# Patient Record
Sex: FEMALE | Race: OTHER | Employment: UNEMPLOYED | URBAN - METROPOLITAN AREA
[De-identification: names, ages, dates, MRNs, and addresses within clinical notes are randomized per-mention and may not be internally consistent; named-entity substitution may affect disease eponyms.]

---

## 2020-06-03 ENCOUNTER — HOSPITAL ENCOUNTER (EMERGENCY)
Age: 45
Discharge: HOME | End: 2020-06-03
Attending: EMERGENCY MEDICINE
Payer: COMMERCIAL

## 2020-06-03 VITALS
WEIGHT: 142 LBS | OXYGEN SATURATION: 99 % | HEIGHT: 67 IN | DIASTOLIC BLOOD PRESSURE: 75 MMHG | BODY MASS INDEX: 22.29 KG/M2 | RESPIRATION RATE: 16 BRPM | HEART RATE: 93 BPM | TEMPERATURE: 98.6 F | SYSTOLIC BLOOD PRESSURE: 116 MMHG

## 2020-06-03 DIAGNOSIS — M75.90 BURSITIS AND TENDINITIS OF SHOULDER REGION: ICD-10-CM

## 2020-06-03 DIAGNOSIS — M75.50 BURSITIS AND TENDINITIS OF SHOULDER REGION: ICD-10-CM

## 2020-06-03 DIAGNOSIS — M79.602 ARM PAIN, ANTERIOR, LEFT: Primary | ICD-10-CM

## 2020-06-03 PROCEDURE — 74011636637 HC RX REV CODE- 636/637: Performed by: EMERGENCY MEDICINE

## 2020-06-03 PROCEDURE — 99283 EMERGENCY DEPT VISIT LOW MDM: CPT

## 2020-06-03 PROCEDURE — 74011250637 HC RX REV CODE- 250/637: Performed by: EMERGENCY MEDICINE

## 2020-06-03 RX ORDER — PREDNISONE 20 MG/1
60 TABLET ORAL DAILY
Qty: 15 TAB | Refills: 0 | Status: SHIPPED | OUTPATIENT
Start: 2020-06-03 | End: 2020-06-08

## 2020-06-03 RX ORDER — PREDNISONE 20 MG/1
60 TABLET ORAL
Status: COMPLETED | OUTPATIENT
Start: 2020-06-03 | End: 2020-06-03

## 2020-06-03 RX ORDER — TRAMADOL HYDROCHLORIDE 50 MG/1
50 TABLET ORAL
Qty: 10 TAB | Refills: 0 | Status: SHIPPED | OUTPATIENT
Start: 2020-06-03 | End: 2020-06-06

## 2020-06-03 RX ORDER — TRAMADOL HYDROCHLORIDE 50 MG/1
50 TABLET ORAL
Status: COMPLETED | OUTPATIENT
Start: 2020-06-03 | End: 2020-06-03

## 2020-06-03 RX ADMIN — PREDNISONE 60 MG: 20 TABLET ORAL at 09:21

## 2020-06-03 RX ADMIN — TRAMADOL HYDROCHLORIDE 50 MG: 50 TABLET, FILM COATED ORAL at 09:21

## 2020-06-03 NOTE — DISCHARGE INSTRUCTIONS
Patient Education        Bursitis: Care Instructions  Your Care Instructions     A bursa is a small sac of fluid that helps the tissues around a joint slide over one another easily. Injury or overuse of a joint can cause pain, redness, and inflammation in the bursa (bursitis). Bursitis usually gets better if you avoid the activity that caused it. You can help prevent bursitis from coming back by doing stretching and strengthening exercises. You may also need to change the way you do some activities. Follow-up care is a key part of your treatment and safety. Be sure to make and go to all appointments, and call your doctor if you are having problems. It's also a good idea to know your test results and keep a list of the medicines you take. How can you care for yourself at home? · Put ice or a cold pack on the area for 10 to 20 minutes at a time. Try to do this every 1 to 2 hours for the next 3 days (when you are awake) or until the swelling goes down. Put a thin cloth between the ice and your skin. · After the 3 days of using ice, you may use heat on the area. You can use a hot water bottle; a warm, moist towel; or a heating pad set on low. You can also try alternating heat and ice. · Rest the area where you have pain. Stop any activities that cause pain. Switch to activities that do not stress the area. · Take pain medicines exactly as directed. ? If the doctor gave you a prescription medicine for pain, take it as prescribed. ? If you are not taking a prescription pain medicine, ask your doctor if you can take an over-the-counter medicine. ? Do not take two or more pain medicines at the same time unless the doctor told you to. Many pain medicines have acetaminophen, which is Tylenol. Too much acetaminophen (Tylenol) can be harmful. · To prevent stiffness, gently move the joint as much as you can without pain every day. As the pain gets better, keep doing range-of-motion exercises.  Ask your doctor for exercises that will make the muscles around the joint stronger. Do these as directed. · You can slowly return to the activity that caused the pain, but do it with less effort until you can do it without pain or swelling. Be sure to warm up before and stretch after you do the activity. When should you call for help? Call your doctor now or seek immediate medical care if:  · You have new or worse symptoms of infection, such as:  ? Increased pain, swelling, warmth, or redness. ? Red streaks leading from the area. ? Pus draining from the area. ? A fever. Watch closely for changes in your health, and be sure to contact your doctor if:  · You do not get better as expected. Where can you learn more? Go to http://talya-swathi.info/  Enter Q970 in the search box to learn more about \"Bursitis: Care Instructions. \"  Current as of: March 2, 2020               Content Version: 12.5  © 7089-3824 [employer]. Care instructions adapted under license by [employer] (which disclaims liability or warranty for this information). If you have questions about a medical condition or this instruction, always ask your healthcare professional. [place] [address] any warranty or liability for your use of this information. Patient Education        Shoulder Bursitis: Care Instructions  Your Care Instructions     Bursitis is inflammation of the bursa. A bursa is a small sac of fluid that cushions a joint and helps it move easily. A bursa sits under the highest point of your shoulder. You can get bursitis by overusing your shoulder, which can happen with activities such as lifting, pitching a ball, or painting. Symptoms of bursitis include pain when you move your arm. Your arm may hurt when you try to lift it, and the pain can reach down the side of your arm. You may have trouble sleeping because of the pain.   Bursitis usually gets better if you avoid the activity that caused it. If pain lasts or gets worse despite home treatment, your doctor may draw fluid from the bursa through a needle. This may relieve your pain and help your doctor know if you have an infection. If so, your doctor will prescribe antibiotics. If you have inflammation only, you may get a corticosteroid shot to reduce swelling and pain. Sometimes surgery is needed to drain or remove the bursa. Follow-up care is a key part of your treatment and safety. Be sure to make and go to all appointments, and call your doctor if you are having problems. It's also a good idea to know your test results and keep a list of the medicines you take. How can you care for yourself at home? · Put ice or a cold pack on your shoulder for 10 to 20 minutes at a time. Put a thin cloth between the ice and your skin. · After 3 days of using ice, use heat on your shoulder. You can use a hot water bottle, a heating pad set on low, or a warm, moist towel. Some doctors suggest alternating between hot and cold. · Rest your shoulder. Stop any activities that cause pain. Switch to activities that do not stress your shoulder. · Take your medicines exactly as prescribed. Call your doctor if you think you are having a problem with your medicine. · If your doctor recommends it, take anti-inflammatory medicines to reduce pain. These include ibuprofen (Advil, Motrin) and naproxen (Aleve). Read and follow all instructions on the label. · To prevent stiffness, gently move the shoulder joint through its full range of motion. As the pain gets better, keep doing range-of-motion exercises. Ask your doctor for exercises that will make the muscles around the shoulder joint stronger. Do these as directed. · You can slowly return to the activity that caused the pain, but do it with less effort until you can do it without pain or swelling. Be sure to warm up before and stretch after you do the activity. When should you call for help?    Call your doctor now or seek immediate medical care if:  · You have a fever. · You have increased swelling or redness in your shoulder. · You cannot use your shoulder, or the pain in your shoulder gets worse. Watch closely for changes in your health, and be sure to contact your doctor if:  · You have pain for 2 weeks or longer despite home treatment. Where can you learn more? Go to http://talya-swahti.info/  Enter M955 in the search box to learn more about \"Shoulder Bursitis: Care Instructions. \"  Current as of: March 2, 2020               Content Version: 12.5  © 0520-6676 [employer], [employer]. Care instructions adapted under license by [employer] (which disclaims liability or warranty for this information). If you have questions about a medical condition or this instruction, always ask your healthcare professional. [address] any warranty or liability for your use of this information.

## 2020-06-03 NOTE — LETTER
[facility] EMERGENCY DEPT 
[address] [facility] [place] [id number] 
[phone] Work/School Note Date: 6/3/2020 To Whom It May concern: 
 
Femi Luna was seen and treated today in the emergency room by the following provider(s): 
Attending Provider: Ann Khalil MD. Femi Luna may return to work on 6/8/2020. Sincerely, Rell Monroe MD

## 2020-06-03 NOTE — ED PROVIDER NOTES
EMERGENCY DEPARTMENT HISTORY AND PHYSICAL EXAM      Date: 6/3/2020  Patient Name: Arpan Cheney    History of Presenting Illness     Chief Complaint   Patient presents with    Arm Pain     left       History Provided By: Patient    HPI: Arpan Cheney, 50 y.o. female with PMHx significant for emphysema, presents ambulatory to the ED with cc of mild to moderate L shoulder pain x 3 days. Denies any associated symptoms. Reports exacerbation with ROM. Reports hx of similar symptoms, states has seen ortho in the past and had steroid injections. Denies any new injury or trauma. Pt specifically denies any fever, chills, CP, SOB, abd pain, NVD. There are no other complaints, changes, or physical findings at this time. PCP: [place], Not On File    No current facility-administered medications on file prior to encounter. Current Outpatient Medications on File Prior to Encounter   Medication Sig Dispense Refill    traMADol-acetaminophen (ULTRACET) 37.5-325 mg per tablet Take 1 Tab by mouth every eight (8) hours as needed for Pain. Max Daily Amount: 3 Tabs. 6 Tab 0    Arm Brace (WRIST SUPPORT SMALL-MEDIUM) misc 2 Each by Does Not Apply route daily. Wear wrist brace for carpal tunnel symptoms daily and at night. 2 Each 0    predniSONE (STERAPRED DS) 10 mg dose pack TAD 21 Tab 0       Past History     Past Medical History:  Past Medical History:   Diagnosis Date    Ill-defined condition     emphsyma       Past Surgical History:  Past Surgical History:   Procedure Laterality Date    HX GYN      tubal ligation 2006       Family History:  History reviewed. No pertinent family history. Social History:  Social History     Tobacco Use    Smoking status: Current Every Day Smoker    Smokeless tobacco: Never Used    Tobacco comment: cigars   Substance Use Topics    Alcohol use: Yes     Comment: occ    Drug use: No       Allergies:   Allergies   Allergen Reactions    Ibuprofen Swelling Review of Systems   Review of Systems   Constitutional: Negative for fever. HENT: Negative for sore throat. Eyes: Negative for photophobia and redness. Respiratory: Negative for shortness of breath and wheezing. Cardiovascular: Negative for chest pain and leg swelling. Gastrointestinal: Negative for abdominal pain, blood in stool, nausea and vomiting. Genitourinary: Negative for difficulty urinating, dysuria, hematuria, menstrual problem and vaginal bleeding. Musculoskeletal: Positive for arthralgias. Negative for back pain and joint swelling. Neurological: Negative for dizziness, seizures, syncope, speech difficulty, weakness, numbness and headaches. Hematological: Negative for adenopathy. Psychiatric/Behavioral: Negative for agitation, confusion and suicidal ideas. The patient is not nervous/anxious. Physical Exam   Physical Exam  Vitals signs and nursing note reviewed. Constitutional:       General: She is not in acute distress. Appearance: She is well-developed. HENT:      Head: Normocephalic and atraumatic. Eyes:      Conjunctiva/sclera: Conjunctivae normal.      Pupils: Pupils are equal, round, and reactive to light. Neck:      Musculoskeletal: Normal range of motion and neck supple. Cardiovascular:      Rate and Rhythm: Normal rate and regular rhythm. Heart sounds: Normal heart sounds. Pulmonary:      Effort: Pulmonary effort is normal. No respiratory distress. Breath sounds: Normal breath sounds. No wheezing. Abdominal:      General: Bowel sounds are normal. There is no distension. Palpations: Abdomen is soft. Tenderness: There is no abdominal tenderness. Musculoskeletal:         General: No tenderness. Comments: Limited ROM LUE 2/2 pain   Skin:     General: Skin is warm and dry. Findings: No rash. Neurological:      Mental Status: She is alert and oriented to person, place, and time.       Cranial Nerves: No cranial nerve deficit. Psychiatric:         Behavior: Behavior normal.         Medical Decision Making   I am the first provider for this patient. I reviewed the vital signs, available nursing notes, past medical history, past surgical history, family history and social history. Vital Signs-Reviewed the patient's vital signs. Patient Vitals for the past 12 hrs:   Temp Pulse Resp BP SpO2   06/03/20 0912 98.6 °F (37 °C) 93 16 116/75 99 %       Records Reviewed: Nursing Notes and Old Medical Records    Provider Notes (Medical Decision Making):   DDx: DJD, MSK pain, bursitis    ED Course:   Initial assessment performed. The patients presenting problems have been discussed, and they are in agreement with the care plan formulated and outlined with them. I have encouraged them to ask questions as they arise throughout their visit. Critical Care Time:   none    Disposition:  DISCHARGE  9:19 AM  The patient has been re-evaluated and is ready for discharge. Reviewed available results with patient. Counseled pt on diagnosis and care plan. Pt has expressed understanding, and all questions have been answered. Pt agrees with plan and agrees to follow up as recommended, or return to the ED if their symptoms worsen. Discharge instructions have been provided and explained to the pt, along with reasons to return to the ED. PLAN:  1. Current Discharge Medication List      START taking these medications    Details   predniSONE (DELTASONE) 20 mg tablet Take 60 mg by mouth daily for 5 days. Qty: 15 Tab, Refills: 0      traMADoL (ULTRAM) 50 mg tablet Take 1 Tab by mouth every six (6) hours as needed for Pain for up to 3 days. Max Daily Amount: 200 mg.  Indications: pain  Qty: 10 Tab, Refills: 0    Associated Diagnoses: Arm pain, anterior, left; Bursitis and tendinitis of shoulder region         CONTINUE these medications which have NOT CHANGED    Details   predniSONE (STERAPRED DS) 10 mg dose pack TAD  Qty: 21 Tab, Refills: 0           2. Follow-up Information     Follow up With Specialties Details [address]  In 1 week  [address] [facility] [address] [phone]        Return to ED if worse     Diagnosis     Clinical Impression:   1. Arm pain, anterior, left    2. Bursitis and tendinitis of shoulder region        Attestations: This note is prepared by Peg Benoit, acting as Scribe for Annie Osorio MD.    Annie Osorio MD: The scribe's documentation has been prepared under my direction and personally reviewed by me in its entirety. I confirm that the note above accurately reflects all work, treatment, procedures, and medical decision making performed by me.

## 2020-06-03 NOTE — ED TRIAGE NOTES
CC chronic left arm pain with exacerbation that started three days ago. Reports upper arm pain with swelling. Reports she works with a [employer]. Emergency Department Nursing Plan of Care       The Nursing Plan of Care is developed from the Nursing assessment and Emergency Department Attending provider initial evaluation. The plan of care may be reviewed in the ED Provider note.     The Plan of Care was developed with the following considerations:   Patient / Family readiness to learn indicated by:verbalized understanding  Persons(s) to be included in education: patient  Barriers to Learning/Limitations:No    Signed     David Quiñonez RN    6/3/2020   9:14 AM

## 2021-12-02 ENCOUNTER — HOSPITAL ENCOUNTER (EMERGENCY)
Age: 46
Discharge: HOME | End: 2021-12-02
Attending: EMERGENCY MEDICINE
Payer: COMMERCIAL

## 2021-12-02 VITALS
TEMPERATURE: 98.5 F | HEIGHT: 67 IN | DIASTOLIC BLOOD PRESSURE: 65 MMHG | WEIGHT: 130 LBS | HEART RATE: 80 BPM | SYSTOLIC BLOOD PRESSURE: 103 MMHG | BODY MASS INDEX: 20.4 KG/M2 | RESPIRATION RATE: 20 BRPM | OXYGEN SATURATION: 100 %

## 2021-12-02 DIAGNOSIS — M54.41 ACUTE RIGHT-SIDED LOW BACK PAIN WITH RIGHT-SIDED SCIATICA: Primary | ICD-10-CM

## 2021-12-02 PROCEDURE — 75810000123 HC INJ'S ANES/STEROID AGT PERIPH NERVE

## 2021-12-02 PROCEDURE — 74011000250 HC RX REV CODE- 250: Performed by: EMERGENCY MEDICINE

## 2021-12-02 PROCEDURE — 74011250636 HC RX REV CODE- 250/636: Performed by: EMERGENCY MEDICINE

## 2021-12-02 PROCEDURE — 99283 EMERGENCY DEPT VISIT LOW MDM: CPT

## 2021-12-02 RX ORDER — TRIAMCINOLONE ACETONIDE 40 MG/ML
40 INJECTION, SUSPENSION INTRA-ARTICULAR; INTRAMUSCULAR ONCE
Status: COMPLETED | OUTPATIENT
Start: 2021-12-02 | End: 2021-12-02

## 2021-12-02 RX ORDER — ACETAMINOPHEN 500 MG
1000 TABLET ORAL
Qty: 20 TABLET | Refills: 0 | OUTPATIENT
Start: 2021-12-02 | End: 2022-09-03

## 2021-12-02 RX ORDER — BUPIVACAINE HYDROCHLORIDE 5 MG/ML
10 INJECTION, SOLUTION EPIDURAL; INTRACAUDAL
Status: COMPLETED | OUTPATIENT
Start: 2021-12-02 | End: 2021-12-02

## 2021-12-02 RX ORDER — NAPROXEN 500 MG/1
500 TABLET ORAL 2 TIMES DAILY WITH MEALS
Qty: 14 TABLET | Refills: 0 | Status: SHIPPED | OUTPATIENT
Start: 2021-12-02 | End: 2021-12-09

## 2021-12-02 RX ADMIN — BUPIVACAINE HYDROCHLORIDE 50 MG: 5 INJECTION, SOLUTION EPIDURAL; INTRACAUDAL at 16:04

## 2021-12-02 RX ADMIN — TRIAMCINOLONE ACETONIDE 40 MG: 40 INJECTION, SUSPENSION INTRA-ARTICULAR; INTRAMUSCULAR at 16:05

## 2021-12-02 NOTE — ED TRIAGE NOTES
Pt comes to ED via EMS from work with complaints of worsening back pain and spasms x 3 days. Pain radiates down both legs. Denies loss of bowel or bladder. Pt has back brace in place and ice pack to back.

## 2021-12-03 NOTE — ED PROVIDER NOTES
The history is provided by the patient. Back Pain   This is a new problem. The current episode started 12 to 24 hours ago. The problem has been rapidly worsening. The problem occurs constantly. Patient reports work related (Repetitive motion and lifting at work) injury. The pain is associated with lifting and excercise. The pain is present in the lower back and right side. The quality of the pain is described as stabbing, shooting, cramping and sharp. The pain radiates to the right thigh. The pain is severe. The symptoms are aggravated by bending, twisting and certain positions. The pain is the same all the time. Pertinent negatives include no bowel incontinence, no perianal numbness, no bladder incontinence, no paresis, no tingling and no weakness. She has tried nothing for the symptoms. Past Medical History:   Diagnosis Date    Ill-defined condition     emphsyma       Past Surgical History:   Procedure Laterality Date    HX GYN      tubal ligation 2006         No family history on file. Social History     Socioeconomic History    Marital status: SINGLE     Spouse name: Not on file    Number of children: Not on file    Years of education: Not on file    Highest education level: Not on file   Occupational History    Not on file   Tobacco Use    Smoking status: Current Every Day Smoker    Smokeless tobacco: Never Used    Tobacco comment: cigars   Substance and Sexual Activity    Alcohol use: Yes     Comment: occ    Drug use: No    Sexual activity: Not on file   Other Topics Concern    Not on file   Social History Narrative    Not on file     Social Determinants of Health     Financial Resource Strain:     Difficulty of Paying Living Expenses: Not on file   Food Insecurity:     Worried About Running Out of Food in the Last Year: Not on file    Ashley of Food in the Last Year: Not on file   Transportation Needs:     Lack of Transportation (Medical):  Not on file    Lack of Transportation (Non-Medical): Not on file   Physical Activity:     Days of Exercise per Week: Not on file    Minutes of Exercise per Session: Not on file   Stress:     Feeling of Stress : Not on file   Social Connections:     Frequency of Communication with Friends and Family: Not on file    Frequency of Social Gatherings with Friends and Family: Not on file    Attends Worship Services: Not on file    Active Member of [address] [place] [address] or Organizations: Not on file    Attends Club or Organization Meetings: Not on file    Marital Status: Not on file   Intimate Partner Violence:     Fear of Current or Ex-Partner: Not on file    Emotionally Abused: Not on file    Physically Abused: Not on file    Sexually Abused: Not on file   Housing Stability:     Unable to Pay for Housing in the Last Year: Not on file    Number of [place] in the Last Year: Not on file    Unstable Housing in the Last Year: Not on file         ALLERGIES: Ibuprofen    Review of Systems   Gastrointestinal: Negative for bowel incontinence. Genitourinary: Negative for bladder incontinence. Musculoskeletal: Positive for back pain. Neurological: Negative for tingling and weakness. All other systems reviewed and are negative. Vitals:    12/02/21 1521   BP: 103/65   Pulse: 80   Resp: 20   Temp: 98.5 °F (36.9 °C)   SpO2: 100%   Weight: 59 kg (130 lb)   Height: 5' 7\" (1.702 m)            Physical Exam  Vitals and nursing note reviewed. Constitutional:       General: She is not in acute distress. Appearance: She is well-developed. HENT:      Head: Normocephalic and atraumatic. Eyes:      Conjunctiva/sclera: Conjunctivae normal.   Cardiovascular:      Rate and Rhythm: Normal rate and regular rhythm. Pulmonary:      Effort: Pulmonary effort is normal. No respiratory distress. Abdominal:      General: There is no distension. Musculoskeletal:         General: No deformity. Normal range of motion. Cervical back: Neck supple.       Lumbar back: Spasms and tenderness present. Back:    Skin:     General: Skin is warm and dry. Neurological:      Mental Status: She is alert. Cranial Nerves: No cranial nerve deficit. Psychiatric:         Behavior: Behavior normal.          MDM     48 y.o. female presents with back pain in lumbar area since yesterday with signs of radicular pain. No acute traumatic onset. No red flag symptoms of fever, weight loss, saddle anesthesia, weakness, fecal/urinary incontinence or urinary retention. Offered symptomatic treatment here. Suspect MSK etiology. No indication for imaging emergently. Patient was recommended to take short course of scheduled NSAIDs and engage in early mobility as definitive treatment. Return precautions discussed for worsening or new concerning symptoms. Procedures    Procedure Note: Trigger Point Injection for Myofascial pain    Performed by Janith Goodell, MD  Indication: muscle/myofascial pain  Muscle body and tendon sheath of the right lumbar paraspinal muscle(s) were injected with 0.5% bupivacaine and 40 mg kenalog under sterile technique for release of muscle spasm/pain. Patient tolerated well with immediate improvement of symptoms and no immediate complications following procedure.     CPT Code:     1 or 2 muscle bodies: 68627

## 2022-09-03 ENCOUNTER — HOSPITAL ENCOUNTER (EMERGENCY)
Age: 47
Discharge: HOME | End: 2022-09-03
Attending: EMERGENCY MEDICINE
Payer: MEDICAID

## 2022-09-03 VITALS
HEART RATE: 68 BPM | HEIGHT: 67 IN | BODY MASS INDEX: 20.4 KG/M2 | SYSTOLIC BLOOD PRESSURE: 136 MMHG | WEIGHT: 130 LBS | RESPIRATION RATE: 18 BRPM | DIASTOLIC BLOOD PRESSURE: 90 MMHG | OXYGEN SATURATION: 99 % | TEMPERATURE: 98.6 F

## 2022-09-03 DIAGNOSIS — R51.9 NONINTRACTABLE HEADACHE, UNSPECIFIED CHRONICITY PATTERN, UNSPECIFIED HEADACHE TYPE: Primary | ICD-10-CM

## 2022-09-03 DIAGNOSIS — H00.12 CHALAZION OF RIGHT LOWER EYELID: ICD-10-CM

## 2022-09-03 PROCEDURE — 99283 EMERGENCY DEPT VISIT LOW MDM: CPT

## 2022-09-03 PROCEDURE — 74011250637 HC RX REV CODE- 250/637: Performed by: NURSE PRACTITIONER

## 2022-09-03 RX ORDER — ACETAMINOPHEN 325 MG/1
1000 TABLET ORAL
Qty: 20 TABLET | Refills: 0 | Status: SHIPPED | OUTPATIENT
Start: 2022-09-03

## 2022-09-03 RX ORDER — BUTALBITAL, ACETAMINOPHEN AND CAFFEINE 50; 325; 40 MG/1; MG/1; MG/1
2 TABLET ORAL
Status: COMPLETED | OUTPATIENT
Start: 2022-09-03 | End: 2022-09-03

## 2022-09-03 RX ORDER — BUTALBITAL, ACETAMINOPHEN AND CAFFEINE 300; 40; 50 MG/1; MG/1; MG/1
1 CAPSULE ORAL
Qty: 6 CAPSULE | Refills: 0 | Status: SHIPPED | OUTPATIENT
Start: 2022-09-03

## 2022-09-03 RX ADMIN — BUTALBITAL, ACETAMINOPHEN, AND CAFFEINE 2 TABLET: 50; 325; 40 TABLET ORAL at 19:20

## 2022-09-03 NOTE — ED TRIAGE NOTES
Pt in ED with c/o right eye pain and right sided headache since this morning. Pt denies injury. Pt denies any meds PTA.

## 2022-09-03 NOTE — Clinical Note
[address]  [facility] EMERGENCY DEPT  [address] [facility]  [place] [id number] [phone]    Work/School Note    Date: 9/3/2022    To Whom It May concern:    Ovidio Cornejo was seen and treated today in the emergency room by the following provider(s):  Attending Provider: Bob Almanza MD  Nurse Practitioner: Darcy Caba, Cyril Brock NP. Ovidio Cornejo is excused from work/school on 09/03/22 and 09/04/22. She is medically clear to return to work/school on 9/5/2022.        Sincerely,          Dimitrios Spaulding NP

## 2022-09-03 NOTE — DISCHARGE INSTRUCTIONS
The first treatment is to place warm compresses over the eyelid for 10 to 15 minutes at least four times a day. Use lukewarm water (no hotter than you can leave your hand in comfortably). ...  DO NOT push or squeeze the chalazion. Or you can apply the warm compress to your eye the better it will feel, do not be surprised if you get drainage from that site as of the cyst opens and drains, this will help with the pain and pressure. [facility] Departments     For adult and child immunizations, family planning, TB screening, STD testing and women's health services. [facility]: [place] [phone]      [facility] 25   657 [facility]   [address]   170 [facility]: [address] [phone] 2400 [facility]          [address] [address]     For primary care services, woman and child wellness, and some clinics providing specialty care. [facility] -- [address] [facility]. [address] [place] [address] [phone]   [address] [place] [address] 200 [facility] [address] [place] [address] [phone]   339 [facility] [address] [phone]   [address] [place] [address]  [phone]   [address] [place] [address] [address] [place] [phone]   [facility] 81 [facility] [phone]   [facility] [address] [place] [address] [phone]   [facility]: [facility] [address], ext [address] [facility], [address] [phone]     [address] [phone]   [place] 5850 [facility]  [phone]   [employer]  [address], [place] [address] ([email or web address]) [phone]         Sexual Health/Woman Wellness Clinics    For STD/HIV testing and treatment, pregnancy testing and services, men's health, birth control services, LGBT services, and hepatitis/HPV vaccine services. [facility] [employer] [facility] [employer] 201 [address] 75 [facility] [address] [facility] 1579 [address] Loya Mutter [phone]   [facility] [address], 5th floor [phone]   Pregnancy [address] for Women [address]  [place] [phone]         [place] 9967 [facility] [address] [place] [address]   [phone]   [place]   [phone]   Women, Infant and Children's Services: [address] [phone]       600 Critical access hospital Crisis Intervention   [phone]   [address]   [employer] [facility]     [phone]   [address] Crisis   1212 [facility] [phone]       Local Primary Care Physicians  [facility] [phone]  MD Venessa Chavez MD Wilbert Haagensen, MD [facility] [phone]  Cornelia Sexton, FNP  MD Alice Álvarez MD Jane Reveal, MD [address] [address] [phone]  MD Nuzhat López MD [address] [facility] [phone]  MD Catie Carr MD Katheran Malling, MD Wilkins Mead, MD   [facility] [phone]  [facility] [place], MD Burton Powell, NP 8364 [employer] [phone]  Ashby Sanfilippo, MD Maree Hoguet, MD Christie Blind, MD Consuella Booze, MD Claudeen Horsfall, MD Adonica Furnace, MD Bridget Glimpse, MD   [facility] [phone]  Landry Yin MD [facility] [phone]  MD Eloy Marlow, MD Sangeeta Benson MD Ailene Rink, MD Chrystal Dragon, MD  Tessie Harpreet, MD   [facility] [address] MD Jericho Zamudio, FNP  Francis Arnold, VICKI Mclaughlin, MD Migel Yañez, MD Arielle Gomez, MD Payal Piña, MD [facility] [phone]  MD Charity Shin, MD Juan Trevino, MD Kerry Lopez, MD Hector Garcia, MD   [facility] [phone]  MD Martín Ta MD [place] [phone]  MD Negro Olivier MD Kendell Gammon, MD   [facility] [phone]  MD Samuel Souza MD Brendalyn Battiest, MD Amairani Cruz, MD Tana Caballero, VICKI Puente MD 1619 [facility]   [phone]  MD Nikolas Coelho MD Dickey Sender, MD   9518 [facility] [phone]  MD Mark Romero, SNOW Bello, SNOW [place] MD Glade Simper, VICKI [place],  Miscellaneous:  Steve Corbett -[phone]     Please establish care with primary care provider.

## 2022-09-03 NOTE — ED PROVIDER NOTES
49 y/o female presents ambulatory to the ER with c/o swelling to the right eye and feels like there is a bump in the lower eye lid. Patient states that she feels pressure and has a right sided headache, intermittent blurry vision with a white drainage. Patient denies injury, eye pain, no redness. Patient denies wearing contact lenses, feeling dizzy or lightheaded, nausea, fever or chills. Past Medical History:   Diagnosis Date    Ill-defined condition     emphsyma       Past Surgical History:   Procedure Laterality Date    HX GYN      tubal ligation 2006         History reviewed. No pertinent family history. Social History     Socioeconomic History    Marital status: SINGLE     Spouse name: Not on file    Number of children: Not on file    Years of education: Not on file    Highest education level: Not on file   Occupational History    Not on file   Tobacco Use    Smoking status: Every Day     Types: Cigars    Smokeless tobacco: Never    Tobacco comments:     cigars   Substance and Sexual Activity    Alcohol use: Yes     Comment: occ    Drug use: Yes     Types: Marijuana    Sexual activity: Not on file   Other Topics Concern    Not on file   Social History Narrative    Not on file     Social Determinants of Health     Financial Resource Strain: Not on file   Food Insecurity: Not on file   Transportation Needs: Not on file   Physical Activity: Not on file   Stress: Not on file   Social Connections: Not on file   Intimate Partner Violence: Not on file   Housing Stability: Not on file         ALLERGIES: Ibuprofen    Review of Systems   Eyes:  Positive for discharge and visual disturbance. Negative for pain, redness and itching. Intermittent blurry vision with drainage. Respiratory: Negative. Cardiovascular: Negative. Gastrointestinal: Negative. Skin:  Negative for rash. Neurological:  Positive for headaches. Negative for dizziness and light-headedness.      Vitals:    09/03/22 1743 09/03/22 1747   BP: (!) 136/90    Pulse: 68    Resp: 18    Temp: 98.6 °F (37 °C)    SpO2: 99% 99%   Weight: 59 kg (130 lb)    Height: 5' 7\" (1.702 m)             Physical Exam  Vitals and nursing note reviewed. Constitutional:       General: She is not in acute distress. Appearance: Normal appearance. She is normal weight. She is not ill-appearing. HENT:      Head: Normocephalic. Jaw: There is normal jaw occlusion. Right Ear: Hearing, tympanic membrane and ear canal normal.      Left Ear: Hearing, tympanic membrane and ear canal normal.   Eyes:      General: Vision grossly intact. Right eye: No foreign body or discharge. Left eye: No foreign body or discharge. Extraocular Movements: Extraocular movements intact. Conjunctiva/sclera: Conjunctivae normal.      Comments: Visual acuity:  R 20/25  L 20/70  Both 20/20  Chalazion to the right lower lid. Slight redness under the right eye. Cardiovascular:      Rate and Rhythm: Normal rate. Pulmonary:      Effort: Pulmonary effort is normal. No respiratory distress. Abdominal:      General: There is no distension. Musculoskeletal:         General: Normal range of motion. Cervical back: Normal range of motion. Skin:     General: Skin is dry. Neurological:      Mental Status: She is alert and oriented to person, place, and time.    Psychiatric:         Mood and Affect: Mood normal.        MDM  Number of Diagnoses or Management Options  Chalazion of right lower eyelid: minor  Nonintractable headache, unspecified chronicity pattern, unspecified headache type: established and improving  Diagnosis management comments: Differential DX: chalazion vs stye vs hordeolum vs corneal abrasion        Amount and/or Complexity of Data Reviewed  Discuss the patient with other providers: yes    Risk of Complications, Morbidity, and/or Mortality  Presenting problems: minimal  Diagnostic procedures: minimal  Management options: minimal    Patient Progress  Patient progress: improved         Procedures    VITAL SIGNS:  Patient Vitals for the past 4 hrs:   Temp Pulse Resp BP SpO2   09/03/22 1747 -- -- -- -- 99 %   09/03/22 1743 98.6 °F (37 °C) 68 18 (!) 136/90 99 %         LABS:  No results found for this or any previous visit (from the past 6 hour(s)). IMAGING:  No orders to display         Medications During Visit:  Medications   butalbital-acetaminophen-caffeine (FIORICET, ESGIC) -40 mg per tablet 2 Tablet (2 Tablets Oral Given 9/3/22 1920)         DECISION MAKING:  Nini Mckenzie is a 50 y.o. female who comes in as above. Appearing 43-year-old female presents ambulatory to the ER with c/o swelling to the right eye and feels like there is a bump in the lower eye lid. Patient states that she feels pressure and has a right sided headache, intermittent blurry vision with a white drainage. Patient denies injury, eye pain, no redness. Patient denies wearing contact lenses, feeling dizzy or lightheaded, nausea, fever or chills. Patient states that she has recently been going through a 15-year break-up, and has been crying quite a bit, rubbing her eyes. Wears reading glasses. On examination, vision grossly intact, EOM intact no pain or discharge to eye, when the lower right eyelid was lowered there is a cyst in the middle of the lower lid where the patient states that the discomfort is. States that she has not taken anything for the pain nor attempted any warm compresses at this time. Discussed plan with patient to call Tuesday to make a follow-up appointment with [facility], apply warm compresses frequently with strict hand hygiene and to return to the ER with worsening of symptoms. Headache controlled with oral medication, no nausea, dizziness, vital signs stable. Patient verbalized understanding agrees with plan. IMPRESSION:  1.  Nonintractable headache, unspecified chronicity pattern, unspecified headache type    2. Chalazion of right lower eyelid        DISPOSITION:  Discharged      Discharge Medication List as of 9/3/2022  7:47 PM        START taking these medications    Details   butalbital-acetaminophen-caff (Fioricet) -40 mg per capsule Take 1 Capsule by mouth every four (4) hours as needed for Headache for up to 6 doses. , Normal, Disp-6 Capsule, R-0      acetaminophen (TYLENOL) 325 mg tablet Take 3 Tablets by mouth every six (6) hours as needed for Pain., Normal, Disp-20 Tablet, R-0              Follow-up Information       Follow up With Specialties Details Why Karina Tolentino 303  Call  Call Tuesday to schedule a follow-up appointment. [address]  [phone]    [facility] EM[facility]GENCY DEPT Emergency Medicine  If symptoms worsen [address]  [phone]              The patient is asked to follow-up with their primary care provider in the next several days. They are to call tomorrow for an appointment. The patient is asked to return promptly for any increased concerns or worsening of symptoms. They can return to this emergency department or any other emergency department.     Brii Park NP  7:57 PM

## 2022-09-04 NOTE — ED NOTES
Pt was discharged and given instructions by [facility] NP . Pt verbalized good understanding of all discharge instructions,prescriptions and F/U care. All questions answered. Pt in stable condition on discharge.

## 2023-01-12 ENCOUNTER — DOCUMENTATION ONLY (OUTPATIENT)
Dept: ONCOLOGY | Age: 48
End: 2023-01-12

## 2023-01-12 NOTE — PROGRESS NOTES
Called to rescheduled missed appointment and voicemail full couldn't leave a message
Dr. Peralta
Dr. Peralta
Dr Hamlin

## 2024-02-16 ENCOUNTER — OFFICE VISIT (OUTPATIENT)
Age: 49
End: 2024-02-16

## 2024-02-16 VITALS
SYSTOLIC BLOOD PRESSURE: 145 MMHG | BODY MASS INDEX: 20.4 KG/M2 | HEIGHT: 67 IN | HEART RATE: 90 BPM | DIASTOLIC BLOOD PRESSURE: 90 MMHG | RESPIRATION RATE: 16 BRPM | WEIGHT: 130 LBS | TEMPERATURE: 98.7 F | OXYGEN SATURATION: 97 %

## 2024-02-16 DIAGNOSIS — R03.0 ELEVATED BLOOD PRESSURE READING: ICD-10-CM

## 2024-02-16 DIAGNOSIS — M53.86 SCIATICA OF RIGHT SIDE ASSOCIATED WITH DISORDER OF LUMBAR SPINE: Primary | ICD-10-CM

## 2024-02-16 DIAGNOSIS — M54.41 ACUTE MIDLINE LOW BACK PAIN WITH RIGHT-SIDED SCIATICA: ICD-10-CM

## 2024-02-16 RX ORDER — KETOROLAC TROMETHAMINE 30 MG/ML
30 INJECTION, SOLUTION INTRAMUSCULAR; INTRAVENOUS ONCE
Status: COMPLETED | OUTPATIENT
Start: 2024-02-16 | End: 2024-02-16

## 2024-02-16 RX ORDER — METHYLPREDNISOLONE 4 MG/1
4 TABLET ORAL SEE ADMIN INSTRUCTIONS
Qty: 1 KIT | Refills: 0 | Status: SHIPPED | OUTPATIENT
Start: 2024-02-16

## 2024-02-16 RX ADMIN — KETOROLAC TROMETHAMINE 30 MG: 30 INJECTION, SOLUTION INTRAMUSCULAR; INTRAVENOUS at 19:23

## 2024-02-16 NOTE — PROGRESS NOTES
2024   Valerie Philip (: 1975) is a 48 y.o. female, New patient, here for evaluation of the following chief complaint(s):  Back Pain (Lower back (mid) lower pain - 2 weeks ago pain started.  Few days ago went to [facility] - gave her a shot in the arm (unsure what) and pain reliever that isnt touching)     ASSESSMENT/PLAN:  Below is the assessment and plan developed based on review of pertinent history, physical exam, labs, studies, and medications.  1. Sciatica of right side associated with disorder of lumbar spine  -     AFL - Filippo Goodman MD, Orthopedic Surgery (back, neck, spine), [place]  2. Acute midline low back pain with right-sided sciatica  -     XR LUMBAR SPINE (2-3 VIEWS); Future  -     ketorolac (TORADOL) injection 30 mg; 30 mg, IntraMUSCular, ONCE, 1 dose, On 24 at 1945Do not administer for more than 5 days  3. Elevated blood pressure reading         Handout given with care instructions  2. OTC for symptom management such as Tylenol. RICE discussed.  3. F/u with Orthopedic  4. Follow up with PCP as needed.  5. Go to ED with development of any acute symptoms.     Follow up:  Return in about 4 weeks (around 3/15/2024) for BP Check with medication change.  Follow up immediately for any new, worsening or changes or if symptoms are not improving over the next 5-7 days.     SUBJECTIVE/OBJECTIVE:    Back Pain         The patient presents with acute low back pain that started 7 days ago. Afebrile with stable vitals; very well-appearing with benign exam. No concerning red flags per history or exam. DDx: strain, sprain, sciatica, MSK pain. Clinical presentation not consistent with  pathology, aortic dissection or AAA. There is no urine/bowel incontinence or perianal numbness to suggest cauda equina. No fever/chills, IVDA to suggest epidural abscess or discitis. No focal weakness or sensory changes to suggest transverse myelitis. Patient was seen at [facility] ED for symptoms 2

## 2024-02-17 NOTE — PATIENT INSTRUCTIONS
Elevated Blood Pressure: Care Instructions  Your Care Instructions    Blood pressure is a measure of how hard the blood pushes against the walls of your arteries. It's normal for blood pressure to go up and down throughout the day. But if it stays up over time, you have high blood pressure.  Two numbers tell you your blood pressure. The first number is the systolic pressure. It shows how hard the blood pushes when your heart is pumping. The second number is the diastolic pressure. It shows how hard the blood pushes between heartbeats, when your heart is relaxed and filling with blood. An ideal blood pressure in adults is less than 120/80 (say \"120 over 80\"). High blood pressure is 140/90 or higher. You have high blood pressure if your top number is 140 or higher or your bottom number is 90 or higher, or both.  The main test for high blood pressure is simple, fast, and painless. To diagnose high blood pressure, your doctor will test your blood pressure at different times. After testing your blood pressure, your doctor may ask you to test it again when you are home.  If you are diagnosed with high blood pressure, you can work with your doctor to make a long-term plan to manage it.  Follow-up care is a key part of your treatment and safety. Be sure to make and go to all appointments, and call your doctor if you are having problems. It's also a good idea to know your test results and keep a list of the medicines you take.  How can you care for yourself at home?  Do not smoke. Smoking increases your risk for heart attack and stroke. If you need help quitting, talk to your doctor about stop-smoking programs and medicines. These can increase your chances of quitting for good.  Stay at a healthy weight.  Try to limit how much sodium you eat to less than 2,300 milligrams (mg) a day. Your doctor may ask you to try to eat less than 1,500 mg a day.  Be physically active. Get at least 30 minutes of exercise on most days of the